# Patient Record
Sex: MALE | Race: WHITE
[De-identification: names, ages, dates, MRNs, and addresses within clinical notes are randomized per-mention and may not be internally consistent; named-entity substitution may affect disease eponyms.]

---

## 2021-02-17 ENCOUNTER — HOSPITAL ENCOUNTER (EMERGENCY)
Dept: HOSPITAL 41 - JD.ED | Age: 21
Discharge: HOME | End: 2021-02-17
Payer: COMMERCIAL

## 2021-02-17 DIAGNOSIS — R10.31: Primary | ICD-10-CM

## 2021-02-17 DIAGNOSIS — Z88.0: ICD-10-CM

## 2021-02-17 PROCEDURE — 81001 URINALYSIS AUTO W/SCOPE: CPT

## 2021-02-17 PROCEDURE — 83690 ASSAY OF LIPASE: CPT

## 2021-02-17 PROCEDURE — 96374 THER/PROPH/DIAG INJ IV PUSH: CPT

## 2021-02-17 PROCEDURE — 85025 COMPLETE CBC W/AUTO DIFF WBC: CPT

## 2021-02-17 PROCEDURE — 74177 CT ABD & PELVIS W/CONTRAST: CPT

## 2021-02-17 PROCEDURE — 80053 COMPREHEN METABOLIC PANEL: CPT

## 2021-02-17 PROCEDURE — 99284 EMERGENCY DEPT VISIT MOD MDM: CPT

## 2021-02-17 PROCEDURE — 36415 COLL VENOUS BLD VENIPUNCTURE: CPT

## 2021-02-17 NOTE — EDM.PDOC
ED HPI GENERAL MEDICAL PROBLEM





- General


Chief Complaint: Abdominal Pain


Stated Complaint: abdominal pain fever


Time Seen by Provider: 02/17/21 20:38


Source of Information: Reports: Patient


History Limitations: Reports: No Limitations





- History of Present Illness


INITIAL COMMENTS - FREE TEXT/NARRATIVE: 





The patient presents with RLQ abdominal pain.  This started at 3am early this 

morning.  He was able to go back to sleep but when he woke up the pain was still

there.  The pain is always there when he pushes in that area but it gets worse 

at times.  He has no appetite.  He is nauseated but he did not vomit.  He has a 

low grad temp and chills.  He has no chest pain, shortness of breath or cough.  

He still has his appendix and gallbladder.  He just finished 10 days of vancom

ycin for clostridium difficile infection.  He had his stool tested again and it 

was negative.


Onset: Sudden


Duration: Hour(s): (3am)


Location: Reports: Abdomen


Quality: Reports: Sharp


Severity: Moderate


Improves with: Reports: None


Worsens with: Reports: None


Associated Symptoms: Reports: Fever/Chills, Nausea/Vomiting.  Denies: Chest 

Pain, Cough, Headaches, Shortness of Breath


  ** Right Lower Abdomen


Pain Score (Numeric/FACES): 4





- Related Data


                                    Allergies











Allergy/AdvReac Type Severity Reaction Status Date / Time


 


Penicillins Allergy  Rash Verified 02/17/21 20:41











Home Meds: 


                                    Home Meds





Montelukast [Singulair] 10 mg PO DAILY PRN 02/17/21 [History]











Past Medical History





- Infectious Disease History


Infectious Disease History: Reports: C-Difficile





- Past Surgical History


HEENT Surgical History: Reports: Tonsillectomy


Male  Surgical History: Reports: Other (See Below)


Other Male  Surgeries/Procedures: hydrocele sx


Musculoskeletal Surgical History: Reports: Arthroscopic Knee, Shoulder Surgery, 

Other (See Below)





Social & Family History





- Family History


Family Medical History: No Pertinent Family History





- Tobacco Use


Tobacco Use Status *Q: Never Tobacco User


Second Hand Smoke Exposure: No





- Caffeine Use


Caffeine Use: Reports: Coffee





- Recreational Drug Use


Recreational Drug Use: No





ED ROS GENERAL





- Review of Systems


Review Of Systems: See Below


Constitutional: Reports: Fever, Chills


HEENT: Reports: No Symptoms


Respiratory: Reports: No Symptoms


Cardiovascular: Reports: No Symptoms


Endocrine: Reports: No Symptoms


GI/Abdominal: Reports: Abdominal Pain, Nausea.  Denies: Diarrhea, Vomiting


: Reports: No Symptoms


Musculoskeletal: Reports: No Symptoms


Skin: Reports: No Symptoms


Neurological: Reports: No Symptoms





ED EXAM, GI/ABD





- Physical Exam


Exam: See Below


Exam Limited By: No Limitations


General Appearance: Alert, No Apparent Distress


Ears: Normal External Exam


Nose: Normal Inspection


Head: Atraumatic, Normocephalic


Neck: Normal Inspection


Respiratory/Chest: No Respiratory Distress, Lungs Clear, Normal Breath Sounds


Cardiovascular: Regular Rate, Rhythm, No Edema, No Murmur


GI/Abdominal Exam: Soft, No Organomegaly, No Mass, Tender (Moderate tenderness 

to the RLQ)


Back Exam: Normal Inspection


Extremities: Normal Inspection


Neurological: Alert, Oriented, No Motor/Sensory Deficits





Course





- Vital Signs


Last Recorded V/S: 


                                Last Vital Signs











Temp  98.7 F   02/17/21 20:37


 


Pulse  87   02/17/21 20:37


 


Resp  18   02/17/21 20:37


 


BP  152/94 H  02/17/21 20:37


 


Pulse Ox  99   02/17/21 20:37














- Orders/Labs/Meds


Orders: 


                               Active Orders 24 hr











 Category Date Time Status


 


 Peripheral IV Care [RC] .AS DIRECTED Care  02/17/21 20:49 Active


 


 Abdomen Pelvis w Cont [CT] Stat Exams  02/17/21 20:49 Taken


 


 Sodium Chloride 0.9% [Saline Flush] Med  02/17/21 20:49 Active





 10 ml FLUSH ASDIRECTED PRN   


 


 Sodium Chloride 0.9% [Saline Flush] Med  02/17/21 22:23 Active





 10 ml FLUSH ONETIME PRN   


 


 ED Antiemetic Medication Reflex [OM.PC] Stat Oth  02/17/21 20:49 Ordered


 


 Peripheral IV Insertion Adult [OM.PC] Stat Oth  02/17/21 20:49 Ordered








                                Medication Orders





Sodium Chloride (Saline Flush)  10 ml FLUSH ASDIRECTED PRN


   PRN Reason: Keep Vein Open


   Last Admin: 02/17/21 20:59  Dose: 10 ml


   Documented by: ALYSA


Sodium Chloride (Saline Flush)  10 ml FLUSH ONETIME PRN


   PRN Reason: IV Flush


   Last Admin: 02/17/21 22:27  Dose: 10 ml


   Documented by: BLOOEVA








Labs: 


                                Laboratory Tests











  02/17/21 02/17/21 02/17/21 Range/Units





  20:55 20:55 21:42 


 


WBC  7.11    (4.23-9.07)  K/mm3


 


RBC  5.15    (4.63-6.08)  M/mm3


 


Hgb  15.5    (13.7-17.5)  gm/dl


 


Hct  44.7    (40.1-51.0)  %


 


MCV  86.8    (79.0-92.2)  fl


 


MCH  30.1    (25.7-32.2)  pg


 


MCHC  34.7    (32.2-35.5)  g/dl


 


RDW Std Deviation  38.3    (35.1-43.9)  fL


 


Plt Count  285    (163-337)  K/mm3


 


MPV  11.0    (9.4-12.3)  fl


 


Neut % (Auto)  65.2    (34.0-67.9)  %


 


Lymph % (Auto)  22.6    (21.8-53.1)  %


 


Mono % (Auto)  10.4    (5.3-12.2)  %


 


Eos % (Auto)  1.3    (0.8-7.0)  


 


Baso % (Auto)  0.4    (0.1-1.2)  %


 


Neut # (Auto)  4.63    (1.78-5.38)  K/mm3


 


Lymph # (Auto)  1.61    (1.32-3.57)  K/mm3


 


Mono # (Auto)  0.74    (0.30-0.82)  K/mm3


 


Eos # (Auto)  0.09    (0.04-0.54)  K/mm3


 


Baso # (Auto)  0.03    (0.01-0.08)  K/mm3


 


Sodium   140   (136-145)  mEq/L


 


Potassium   3.8   (3.5-5.1)  mEq/L


 


Chloride   105   ()  mEq/L


 


Carbon Dioxide   25   (21-32)  mEq/L


 


Anion Gap   13.8   (5-15)  


 


BUN   14   (7-18)  mg/dL


 


Creatinine   0.8   (0.7-1.3)  mg/dL


 


Est Cr Clr Drug Dosing   TNP   


 


Estimated GFR (MDRD)   > 60   (>60)  mL/min


 


BUN/Creatinine Ratio   17.5   (14-18)  


 


Glucose   114 H   ()  mg/dL


 


Calcium   9.0   (8.5-10.1)  mg/dL


 


Total Bilirubin   0.2   (0.2-1.0)  mg/dL


 


AST   25   (15-37)  U/L


 


ALT   68 H   (16-63)  U/L


 


Alkaline Phosphatase   116   ()  U/L


 


Total Protein   7.8   (6.4-8.2)  g/dl


 


Albumin   4.2   (3.4-5.0)  g/dl


 


Globulin   3.6   gm/dL


 


Albumin/Globulin Ratio   1.2   (1-2)  


 


Lipase   127   ()  U/L


 


Urine Color    Yellow  (Yellow)  


 


Urine Appearance    Clear  (Clear)  


 


Urine pH    6.0  (5.0-8.0)  


 


Ur Specific Gravity    > or = 1.030  (1.005-1.030)  


 


Urine Protein    Negative  (Negative)  


 


Urine Glucose (UA)    Negative  (Negative)  


 


Urine Ketones    Negative  (Negative)  


 


Urine Occult Blood    Negative  (Negative)  


 


Urine Nitrite    Negative  (Negative)  


 


Urine Bilirubin    1+ H  (Negative)  


 


Urine Urobilinogen    0.2  (0.2-1.0)  


 


Ur Leukocyte Esterase    Negative  (Negative)  


 


Urine RBC    Not seen  (0-5)  /hpf


 


Urine WBC    0-5  (0-5)  /hpf


 


Ur Squamous Epith Cells    0-5  (0-5)  /hpf


 


Urine Bacteria    Not seen  (FEW)  /hpf


 


Urine Mucus    Few  (FEW)  /hpf











Meds: 


Medications











Generic Name Dose Route Start Last Admin





  Trade Name Delores  PRN Reason Stop Dose Admin


 


Sodium Chloride  10 ml  02/17/21 20:49  02/17/21 20:59





  Saline Flush  FLUSH   10 ml





  ASDIRECTED PRN   Administration





  Keep Vein Open  


 


Sodium Chloride  10 ml  02/17/21 22:23  02/17/21 22:27





  Saline Flush  FLUSH   10 ml





  ONETIME PRN   Administration





  IV Flush  














Discontinued Medications














Generic Name Dose Route Start Last Admin





  Trade Name Delores  PRN Reason Stop Dose Admin


 


Diatrizoate Meglum/Diatrizoate Sod  90 ml  02/17/21 22:23  02/17/21 22:27





  Gastrografin 37%  PO  02/17/21 22:24  90 ml





  ONETIME ONE   Administration


 


Sodium Chloride  1,000 mls @ 1,000 mls/hr  02/17/21 20:49  02/17/21 20:58





  Normal Saline  IV  02/17/21 21:48  1,000 mls/hr





  .BOLUS STA   Administration


 


Iopamidol  100 ml  02/17/21 22:23  02/17/21 22:27





  Isovue-300 (61%)  IVPUSH  02/17/21 22:24  100 ml





  ONETIME ONE   Administration


 


Ondansetron HCl  4 mg  02/17/21 20:49  02/17/21 20:58





  Zofran  IVPUSH  02/17/21 20:50  4 mg





  ONETIME ONE   Administration














- Re-Assessments/Exams


Free Text/Narrative Re-Assessment/Exam: 





02/17/21 20:55


I ordered an IV NS 1L bolus, zofran 4mg IV, labs, UA and a CT of his abdomen and

 pelvis.


02/17/21 22:43


His CBC and CMP look good.  His UA shows no UTI.  His CT shows nothing acute.  I

 will discharge him home.





Departure





- Departure


Time of Disposition: 22:45


Disposition: Home, Self-Care 01


Condition: Good


Clinical Impression: 


Abdominal pain


Qualifiers:


 Abdominal location: right lower quadrant Qualified Code(s): R10.31 - Right 

lower quadrant pain








- Discharge Information


*PRESCRIPTION DRUG MONITORING PROGRAM REVIEWED*: Not Applicable


*COPY OF PRESCRIPTION DRUG MONITORING REPORT IN PATIENT DELFINA: Not Applicable


Referrals: 


PCP,None [Primary Care Provider] - 


Adina Edwards MD [Physician] - 1 Week


Forms:  ED Department Discharge


Additional Instructions: 


Drink plenty of fluids.  Take tylenol or motrin for pain.  Follow up with Dr Mayo if you are not better within a week.  Please return if you are worse.





Sepsis Event Note (ED)





- Evaluation


Sepsis Screening Result: No Definite Risk





- Focused Exam


Vital Signs: 


                                   Vital Signs











  Temp Pulse Resp BP Pulse Ox


 


 02/17/21 20:37  98.7 F  87  18  152/94 H  99














- My Orders


Last 24 Hours: 


My Active Orders





02/17/21 20:49


Peripheral IV Care [RC] .AS DIRECTED 


Abdomen Pelvis w Cont [CT] Stat 


Sodium Chloride 0.9% [Saline Flush]   10 ml FLUSH ASDIRECTED PRN 


ED Antiemetic Medication Reflex [OM.PC] Stat 


Peripheral IV Insertion Adult [OM.PC] Stat 





02/17/21 22:23


Sodium Chloride 0.9% [Saline Flush]   10 ml FLUSH ONETIME PRN 














- Assessment/Plan


Last 24 Hours: 


My Active Orders





02/17/21 20:49


Peripheral IV Care [RC] .AS DIRECTED 


Abdomen Pelvis w Cont [CT] Stat 


Sodium Chloride 0.9% [Saline Flush]   10 ml FLUSH ASDIRECTED PRN 


ED Antiemetic Medication Reflex [OM.PC] Stat 


Peripheral IV Insertion Adult [OM.PC] Stat 





02/17/21 22:23


Sodium Chloride 0.9% [Saline Flush]   10 ml FLUSH ONETIME PRN

## 2021-02-18 NOTE — CT
CT abdomen and pelvis

 

Technique: Multiple axial sections were obtained from above the dome 

of the diaphragm inferiorly through the pubic symphysis.  Intravenous 

and oral contrast was utilized.  Delayed images were also obtained 

through the bladder.  Reconstructed coronal and sagittal images were 

obtained.

 

Comparison: Previous pelvic ultrasound of the abdominal wall dated 

01/27/21.

 

Findings: Visualized lung bases show nothing acute.

 

Liver and spleen shows no focal parenchymal abnormality.  Gallbladder 

contains no calcified gallstones.  Pancreas is within normal limits.  

Adrenal glands show no nodule.  Kidneys show no hydronephrosis or 

mass.  No ureteral dilatation is appreciated.

 

Abdominal aorta shows no aneurysm.  No retroperitoneal adenopathy or 

mesenteric abnormalities are seen.  Mesenteric lymph nodes are seen 

within the right lower abdomen which are felt to be within normal 

limits.  Appendix is seen which is normal.  No pelvic mass or 

adenopathy is identified.

 

Delayed images show contrast within the distal ureters and within the 

bladder.

 

Bone window settings were reviewed which show no acute osseous 

finding.

 

Impression:

1.  Nothing acute is appreciated on CT study of the abdomen and 

pelvis.

 

Diagnostic code #1

 

I agree with preliminary report from [facility], finalized on 02/17/21, 

11:39 PM CST